# Patient Record
Sex: FEMALE | URBAN - METROPOLITAN AREA
[De-identification: names, ages, dates, MRNs, and addresses within clinical notes are randomized per-mention and may not be internally consistent; named-entity substitution may affect disease eponyms.]

---

## 2017-04-07 ENCOUNTER — APPOINTMENT (RX ONLY)
Age: 27
Setting detail: DERMATOLOGY
End: 2017-04-07

## 2017-04-07 DIAGNOSIS — K11.6 MUCOCELE OF SALIVARY GLAND: ICD-10-CM

## 2017-04-07 DIAGNOSIS — L72.0 EPIDERMAL CYST: ICD-10-CM

## 2017-04-07 PROCEDURE — ? OTHER

## 2017-04-07 PROCEDURE — 99202 OFFICE O/P NEW SF 15 MIN: CPT

## 2017-04-07 PROCEDURE — ? COUNSELING

## 2017-04-07 ASSESSMENT — LOCATION ZONE DERM
LOCATION ZONE: LIP
LOCATION ZONE: EAR

## 2017-04-07 ASSESSMENT — LOCATION SIMPLE DESCRIPTION DERM
LOCATION SIMPLE: LEFT INFERIOR MUCOSAL LIP
LOCATION SIMPLE: LEFT EAR

## 2017-04-07 ASSESSMENT — LOCATION DETAILED DESCRIPTION DERM
LOCATION DETAILED: LEFT INFERIOR MUCOSAL LIP
LOCATION DETAILED: LEFT POSTERIOR EAR

## 2017-04-07 NOTE — PROCEDURE: OTHER
Detail Level: Zone
Note Text (......Xxx Chief Complaint.): This diagnosis correlates with the
Other (Free Text): See dictated note

## 2018-03-27 ENCOUNTER — APPOINTMENT (RX ONLY)
Age: 28
Setting detail: DERMATOLOGY
End: 2018-03-27

## 2018-03-27 DIAGNOSIS — I83.9 ASYMPTOMATIC VARICOSE VEINS OF LOWER EXTREMITIES: ICD-10-CM

## 2018-03-27 DIAGNOSIS — K11.6 MUCOCELE OF SALIVARY GLAND: ICD-10-CM

## 2018-03-27 DIAGNOSIS — L24 IRRITANT CONTACT DERMATITIS: ICD-10-CM

## 2018-03-27 DIAGNOSIS — L72.0 EPIDERMAL CYST: ICD-10-CM

## 2018-03-27 PROBLEM — I83.90 ASYMPTOMATIC VARICOSE VEINS OF UNSPECIFIED LOWER EXTREMITY: Status: ACTIVE | Noted: 2018-03-27

## 2018-03-27 PROBLEM — L24.9 IRRITANT CONTACT DERMATITIS, UNSPECIFIED CAUSE: Status: ACTIVE | Noted: 2018-03-27

## 2018-03-27 PROCEDURE — 99213 OFFICE O/P EST LOW 20 MIN: CPT

## 2018-03-27 PROCEDURE — ? COUNSELING: TOPICAL STEROIDS

## 2018-03-27 PROCEDURE — ? DIAGNOSIS COMMENT

## 2018-03-27 PROCEDURE — ? PRESCRIPTION

## 2018-03-27 PROCEDURE — ? COUNSELING

## 2018-03-27 PROCEDURE — ? OTHER

## 2018-03-27 RX ORDER — TRIAMCINOLONE ACETONIDE 1 MG/G
OINTMENT TOPICAL
Qty: 1 | Refills: 1 | Status: ERX | COMMUNITY
Start: 2018-03-27

## 2018-03-27 RX ADMIN — TRIAMCINOLONE ACETONIDE: 1 OINTMENT TOPICAL at 18:40

## 2018-03-27 ASSESSMENT — LOCATION DETAILED DESCRIPTION DERM
LOCATION DETAILED: RIGHT GLUTEAL CREASE
LOCATION DETAILED: LEFT PROXIMAL POSTERIOR THIGH

## 2018-03-27 ASSESSMENT — LOCATION ZONE DERM: LOCATION ZONE: LEG

## 2018-03-27 ASSESSMENT — LOCATION SIMPLE DESCRIPTION DERM
LOCATION SIMPLE: LEFT POSTERIOR THIGH
LOCATION SIMPLE: RIGHT GLUTEAL CREASE

## 2018-03-27 NOTE — PROCEDURE: DIAGNOSIS COMMENT
Comment: This has resolved since pt’s last visit.
Comment: She has erythemaous scaly plaques on the right and left gluteal crease. There is some mild crusting from recent use of the apply cider vinegar.\\nKOH prep negative.\\nShe will start triamcinolone ointment BID for 1-2 weeks. Call if worsens or does not improve in this time. Also recommended Vaseline or Aquaphor.
Detail Level: Simple
Comment: Cyst on left ear is gone.

## 2018-03-27 NOTE — HPI: RASH
How Severe Is Your Rash?: moderate
Is This A New Presentation, Or A Follow-Up?: Rash
Additional History: She reports that coconut oil and the diluted tea tree oil caused the rash to flare. She now presents for evaluation and management.

## 2018-03-27 NOTE — PROCEDURE: COUNSELING: TOPICAL STEROIDS
Detail Level: Zone
Topical Steroids Counseling: I discussed with the patient that prolonged use of topical steroids can result in the increased appearance of superficial blood vessels (telangiectasias), lightening (hypopigmentation) and thinning of the skin (atrophy).  Patient understands to avoid using high potency steroids in skin folds, the groin or the face. The patient verbalized understanding of the proper use and possible adverse effects of topical steroids.  All of the patient's questions and concerns were addressed.

## 2018-03-27 NOTE — PROCEDURE: OTHER
Note Text (......Xxx Chief Complaint.): This diagnosis correlates with the
Detail Level: Zone
Other (Free Text): - she has some mild spider veins and varicosities on legs. She does not have LE edema. Discussed compression hose, which she already wears sometimes. She will try to increase this. She is not interested in sclerotherapy at this time.